# Patient Record
Sex: FEMALE | Race: BLACK OR AFRICAN AMERICAN
[De-identification: names, ages, dates, MRNs, and addresses within clinical notes are randomized per-mention and may not be internally consistent; named-entity substitution may affect disease eponyms.]

---

## 2023-05-15 ENCOUNTER — DASHBOARD ENCOUNTERS (OUTPATIENT)
Age: 33
End: 2023-05-15

## 2023-05-15 ENCOUNTER — TELEPHONE ENCOUNTER (OUTPATIENT)
Dept: URBAN - METROPOLITAN AREA CLINIC 84 | Facility: CLINIC | Age: 33
End: 2023-05-15

## 2023-05-15 ENCOUNTER — OFFICE VISIT (OUTPATIENT)
Dept: URBAN - METROPOLITAN AREA CLINIC 84 | Facility: CLINIC | Age: 33
End: 2023-05-15
Payer: COMMERCIAL

## 2023-05-15 ENCOUNTER — WEB ENCOUNTER (OUTPATIENT)
Dept: URBAN - METROPOLITAN AREA CLINIC 84 | Facility: CLINIC | Age: 33
End: 2023-05-15

## 2023-05-15 VITALS
HEIGHT: 65 IN | TEMPERATURE: 97.9 F | HEART RATE: 70 BPM | BODY MASS INDEX: 19.83 KG/M2 | SYSTOLIC BLOOD PRESSURE: 97 MMHG | DIASTOLIC BLOOD PRESSURE: 61 MMHG | WEIGHT: 119 LBS

## 2023-05-15 DIAGNOSIS — K76.9 LIVER LESION: ICD-10-CM

## 2023-05-15 DIAGNOSIS — M62.08 RECTUS DIASTASIS OF LOWER ABDOMEN: ICD-10-CM

## 2023-05-15 DIAGNOSIS — R94.8 ABNORMAL BILIARY HIDA SCAN: ICD-10-CM

## 2023-05-15 DIAGNOSIS — K90.49 FOOD INTOLERANCE IN ADULT: ICD-10-CM

## 2023-05-15 PROBLEM — 235719002: Status: ACTIVE | Noted: 2023-05-15

## 2023-05-15 PROBLEM — 300331000: Status: ACTIVE | Noted: 2023-05-15

## 2023-05-15 PROCEDURE — 99204 OFFICE O/P NEW MOD 45 MIN: CPT | Performed by: INTERNAL MEDICINE

## 2023-05-15 RX ORDER — HYOSCYAMINE SULFATE 0.12 MG/1
1 TABLET AS NEEDED TABLET ORAL THREE TIMES A DAY
Qty: 90 TABLET | Refills: 3 | OUTPATIENT
Start: 2023-05-15

## 2023-05-15 NOTE — HPI-TODAY'S VISIT:
32yo pt presents with c/o abdominal pain associated with bloating/gas and h/o abd diasthasis. She was recently evaluated in the ER with CT scan abd showing 23mm liver lesion and normal gallbladder without gallstones. HIDA scan reveal 10% injection fraction.

## 2023-05-15 NOTE — PHYSICAL EXAM EYES:
Conjuntivae and eyelids appear normal , Sclerae : White without injection No significant past surgical history